# Patient Record
Sex: FEMALE | Race: BLACK OR AFRICAN AMERICAN | URBAN - METROPOLITAN AREA
[De-identification: names, ages, dates, MRNs, and addresses within clinical notes are randomized per-mention and may not be internally consistent; named-entity substitution may affect disease eponyms.]

---

## 2021-08-28 ENCOUNTER — TELEPHONE ENCOUNTER (OUTPATIENT)
Dept: URBAN - METROPOLITAN AREA CLINIC 13 | Facility: CLINIC | Age: 66
End: 2021-08-28

## 2021-08-29 ENCOUNTER — TELEPHONE ENCOUNTER (OUTPATIENT)
Dept: URBAN - METROPOLITAN AREA CLINIC 13 | Facility: CLINIC | Age: 66
End: 2021-08-29

## 2024-07-30 ENCOUNTER — DASHBOARD ENCOUNTERS (OUTPATIENT)
Age: 69
End: 2024-07-30

## 2024-07-31 ENCOUNTER — OFFICE VISIT (OUTPATIENT)
Dept: URBAN - METROPOLITAN AREA CLINIC 48 | Facility: CLINIC | Age: 69
End: 2024-07-31
Payer: MEDICARE

## 2024-07-31 DIAGNOSIS — Z12.11 COLON CANCER SCREENING: ICD-10-CM

## 2024-07-31 DIAGNOSIS — K59.09 CHRONIC CONSTIPATION: ICD-10-CM

## 2024-07-31 DIAGNOSIS — D50.9 IRON DEFICIENCY ANEMIA, UNSPECIFIED IRON DEFICIENCY ANEMIA TYPE: ICD-10-CM

## 2024-07-31 DIAGNOSIS — Z86.19 HISTORY OF HELICOBACTER PYLORI INFECTION: ICD-10-CM

## 2024-07-31 PROBLEM — 87522002: Status: ACTIVE | Noted: 2024-07-31

## 2024-07-31 PROCEDURE — 99204 OFFICE O/P NEW MOD 45 MIN: CPT | Performed by: PHYSICIAN ASSISTANT

## 2024-07-31 RX ORDER — FERROUS SULFATE TAB EC 325 MG (65 MG FE EQUIVALENT) 325 (65 FE) MG
1 TABLET TABLET DELAYED RESPONSE ORAL ONCE A DAY
Refills: 0 | Status: ACTIVE | COMMUNITY

## 2024-07-31 RX ORDER — PANTOPRAZOLE SODIUM 40 MG/1
1 TABLET TABLET, DELAYED RELEASE ORAL ONCE A DAY
Qty: 90 TABLET | Refills: 3 | Status: ACTIVE | COMMUNITY
Start: 2024-07-31

## 2024-07-31 RX ORDER — ROSUVASTATIN CALCIUM 10 MG/1
1 TABLET TABLET ORAL ONCE A DAY
Refills: 0 | Status: ACTIVE | COMMUNITY

## 2024-07-31 RX ORDER — AMLODIPINE BESYLATE 5 MG/1
1 TABLET TABLET ORAL ONCE A DAY
Refills: 1 | Status: ACTIVE | COMMUNITY

## 2024-07-31 RX ORDER — TRAMADOL HYDROCHLORIDE 50 MG/1
1 TABLET AS NEEDED TABLET ORAL ONCE A DAY
Refills: 0 | Status: ON HOLD | COMMUNITY

## 2024-07-31 RX ORDER — LISINOPRIL AND HYDROCHLOROTHIAZIDE 12.5; 2 MG/1; MG/1
1 TABLET TABLET ORAL ONCE A DAY
Refills: 1 | Status: ACTIVE | COMMUNITY

## 2024-07-31 NOTE — HPI-TODAY'S VISIT:
70 y/o female presents for colon cancer screening. She has h/o HTN, HLD, GERD, Vit D deficiency, SUSANA, CKD. She was seen in the ED 5/2024 for RUQ pain and underwent cholecystectomy by Dr. Bledsoe. She has been seeing pulmonary for evaluation of bilateral lung nodules and had recent bronchoscopy with Bx showing no malignancy or granulomas. She has chronic anemia with iron deficiency, on oral iron therapy (for the last couple of months), dating back to 2022. Repeat labs 7/30/24 showed Hgb of 10.2 and iron studies were in normal range.  She admits to chronic constipation X years which has improved since cholecystectomy. She is now having a normal bowel movement usually every other day, and takes Miralax PRN with relief. She denies melena or hematochezia, but stools appear dark since on oral iron. She denies abdominal pain, indigestion, reflux. She states her last colonoscopy was 10 or so years ago and she also has remote h/o H. pylori. She states her siblings and mother have h/o colon polyps.

## 2024-09-30 ENCOUNTER — OFFICE VISIT (OUTPATIENT)
Dept: URBAN - METROPOLITAN AREA SURGERY CENTER 28 | Facility: SURGERY CENTER | Age: 69
End: 2024-09-30

## 2024-11-26 ENCOUNTER — WEB ENCOUNTER (OUTPATIENT)
Dept: URBAN - METROPOLITAN AREA CLINIC 46 | Facility: CLINIC | Age: 69
End: 2024-11-26

## 2024-12-04 ENCOUNTER — WEB ENCOUNTER (OUTPATIENT)
Dept: URBAN - METROPOLITAN AREA CLINIC 46 | Facility: CLINIC | Age: 69
End: 2024-12-04

## 2025-01-14 ENCOUNTER — OFFICE VISIT (OUTPATIENT)
Dept: URBAN - METROPOLITAN AREA CLINIC 48 | Facility: CLINIC | Age: 70
End: 2025-01-14
Payer: MEDICARE

## 2025-01-14 VITALS
HEIGHT: 64 IN | WEIGHT: 132.6 LBS | BODY MASS INDEX: 22.64 KG/M2 | TEMPERATURE: 97.5 F | SYSTOLIC BLOOD PRESSURE: 110 MMHG | DIASTOLIC BLOOD PRESSURE: 62 MMHG | OXYGEN SATURATION: 99 % | HEART RATE: 77 BPM

## 2025-01-14 DIAGNOSIS — D50.9 IRON DEFICIENCY ANEMIA, UNSPECIFIED IRON DEFICIENCY ANEMIA TYPE: ICD-10-CM

## 2025-01-14 DIAGNOSIS — K59.09 CHRONIC CONSTIPATION: ICD-10-CM

## 2025-01-14 DIAGNOSIS — L50.9 HIVES OF UNKNOWN ORIGIN: ICD-10-CM

## 2025-01-14 PROCEDURE — 99213 OFFICE O/P EST LOW 20 MIN: CPT | Performed by: PHYSICIAN ASSISTANT

## 2025-01-14 RX ORDER — ROSUVASTATIN CALCIUM 10 MG/1
1 TABLET TABLET ORAL ONCE A DAY
Refills: 0 | Status: ACTIVE | COMMUNITY

## 2025-01-14 RX ORDER — PANTOPRAZOLE SODIUM 40 MG/1
1 TABLET TABLET, DELAYED RELEASE ORAL ONCE A DAY
Qty: 90 TABLET | Refills: 3 | Status: ON HOLD | COMMUNITY
Start: 2024-07-31

## 2025-01-14 RX ORDER — LACTOBACILLUS RHAMNOSUS GG 10B CELL
AS DIRECTED CAPSULE ORAL
Status: ACTIVE | COMMUNITY

## 2025-01-14 RX ORDER — LISINOPRIL AND HYDROCHLOROTHIAZIDE 12.5; 2 MG/1; MG/1
1 TABLET TABLET ORAL ONCE A DAY
Refills: 1 | Status: ACTIVE | COMMUNITY

## 2025-01-14 RX ORDER — AMLODIPINE BESYLATE 10 MG/1
1 TABLET TABLET ORAL ONCE A DAY
Refills: 1 | Status: ACTIVE | COMMUNITY

## 2025-01-14 RX ORDER — FERROUS SULFATE TAB EC 325 MG (65 MG FE EQUIVALENT) 325 (65 FE) MG
1 TABLET TABLET DELAYED RESPONSE ORAL ONCE A DAY
Refills: 0 | Status: ACTIVE | COMMUNITY

## 2025-01-14 RX ORDER — DICYCLOMINE HYDROCHLORIDE 20 MG/1
1 TABLET TABLET ORAL THREE TIMES A DAY
Qty: 90 | Refills: 3 | Status: ON HOLD | COMMUNITY
Start: 2024-12-02 | End: 2025-04-01

## 2025-01-14 NOTE — HPI-TODAY'S VISIT:
70 y/o female presents for follow up. She was seen in July 2024 for CRCS, chronic constipation, and chronic anemia. She has h/o HTN, HLD, GERD, Vit D deficiency, SUSANA, CKD. She was seen in the ED 5/2024 for RUQ pain and underwent cholecystectomy by Dr. Bledsoe. She has been seeing pulmonary for evaluation of bilateral lung nodules and had recent bronchoscopy with Bx showing no malignancy or granulomas. She has chronic anemia with iron deficiency, on oral iron therapy (for the last couple of months), dating back to 2022. Repeat labs 7/30/24 showed Hgb of 10.2 and iron studies were in normal range. Labs 10/30/24 show stable Hgb of 9.7, iron panel within normal ranges, Cr 1.41 and GFR 40.   She admits to chronic constipation X years which has improved since cholecystectomy. She denies melena or hematochezia, but stools appear dark since on oral iron. She denies abdominal pain, indigestion, reflux. EGD/Colon done 9/2024 showed small hiatla hernia, H. pylori negative gastritis, 2 small hyperplastic polyps removed, non bleeding internal hemorrhoids, pan diverticulosis. She states her siblings and mother have h/o colon polyps.  Since her colonoscopy, she reports continued constipation with a movement every 2-4 days. However, she now states with every bowel movement, she gets tingling/prickling of her skin diffusely, which she also feels in her lips and tongue, and then when she has a bowel movement, she breaks out in hives and has diffuse itching. She has tried taking Benadryl. She took some probiotics which initially seemed to give improvement, but symptoms have returned. Last episode was Sunday. Symptoms are worse the more constipated she feels. She has been taking some Miralax, but not routinely.

## 2025-02-12 ENCOUNTER — OFFICE VISIT (OUTPATIENT)
Dept: URBAN - METROPOLITAN AREA CLINIC 48 | Facility: CLINIC | Age: 70
End: 2025-02-12

## 2025-02-18 ENCOUNTER — OFFICE VISIT (OUTPATIENT)
Dept: URBAN - METROPOLITAN AREA CLINIC 48 | Facility: CLINIC | Age: 70
End: 2025-02-18
Payer: MEDICARE

## 2025-02-18 VITALS
WEIGHT: 136.8 LBS | SYSTOLIC BLOOD PRESSURE: 135 MMHG | OXYGEN SATURATION: 99 % | TEMPERATURE: 97.9 F | HEIGHT: 64 IN | DIASTOLIC BLOOD PRESSURE: 80 MMHG | BODY MASS INDEX: 23.35 KG/M2 | HEART RATE: 70 BPM

## 2025-02-18 DIAGNOSIS — D50.9 IRON DEFICIENCY ANEMIA, UNSPECIFIED IRON DEFICIENCY ANEMIA TYPE: ICD-10-CM

## 2025-02-18 DIAGNOSIS — K59.09 CHRONIC CONSTIPATION: ICD-10-CM

## 2025-02-18 PROCEDURE — 99213 OFFICE O/P EST LOW 20 MIN: CPT | Performed by: PHYSICIAN ASSISTANT

## 2025-02-18 RX ORDER — LISINOPRIL AND HYDROCHLOROTHIAZIDE 12.5; 2 MG/1; MG/1
1 TABLET TABLET ORAL ONCE A DAY
Refills: 1 | Status: ACTIVE | COMMUNITY

## 2025-02-18 RX ORDER — AMLODIPINE BESYLATE 10 MG/1
1 TABLET TABLET ORAL ONCE A DAY
Refills: 1 | Status: ACTIVE | COMMUNITY

## 2025-02-18 RX ORDER — DICYCLOMINE HYDROCHLORIDE 20 MG/1
1 TABLET TABLET ORAL THREE TIMES A DAY
Qty: 90 | Refills: 3 | Status: ON HOLD | COMMUNITY
Start: 2024-12-02 | End: 2025-04-01

## 2025-02-18 RX ORDER — FERROUS SULFATE TAB EC 325 MG (65 MG FE EQUIVALENT) 325 (65 FE) MG
1 TABLET TABLET DELAYED RESPONSE ORAL ONCE A DAY
Refills: 0 | Status: ACTIVE | COMMUNITY

## 2025-02-18 RX ORDER — LACTOBACILLUS RHAMNOSUS GG 10B CELL
AS DIRECTED CAPSULE ORAL
Status: ACTIVE | COMMUNITY

## 2025-02-18 RX ORDER — ROSUVASTATIN CALCIUM 10 MG/1
1 TABLET TABLET ORAL ONCE A DAY
Refills: 0 | Status: ACTIVE | COMMUNITY

## 2025-02-18 RX ORDER — PANTOPRAZOLE SODIUM 40 MG/1
1 TABLET TABLET, DELAYED RELEASE ORAL ONCE A DAY
Qty: 90 TABLET | Refills: 3 | Status: ON HOLD | COMMUNITY
Start: 2024-07-31

## 2025-02-18 NOTE — HPI-TODAY'S VISIT:
70 y/o female presents for follow up. She was seen in July 2024 for CRCS, chronic constipation, and chronic anemia. She has h/o HTN, HLD, GERD, Vit D deficiency, SUSANA, CKD. She was seen in the ED 5/2024 for RUQ pain and underwent cholecystectomy by Dr. Bledsoe. She has been seeing pulmonary for evaluation of bilateral lung nodules and had recent bronchoscopy with Bx showing no malignancy or granulomas. She has chronic anemia with iron deficiency, on oral iron therapy (for the last couple of months), dating back to 2022. Repeat labs 7/30/24 showed Hgb of 10.2 and iron studies were in normal range. Labs 10/30/24 show stable Hgb of 9.7, iron panel within normal ranges, Cr 1.41 and GFR 40.   She admits to chronic constipation X years which has improved since cholecystectomy. She denies melena or hematochezia, but stools appear dark since on oral iron. She denies abdominal pain, indigestion, reflux. EGD/Colon done 9/2024 showed small hiatla hernia, H. pylori negative gastritis, 2 small hyperplastic polyps removed, non bleeding internal hemorrhoids, pan diverticulosis. She states her siblings and mother have h/o colon polyps.  Since her colonoscopy, she reports continued constipation with a movement every 2-4 days. However, she now states with every bowel movement, she gets tingling/prickling of her skin diffusely, which she also feels in her lips and tongue, and then when she has a bowel movement, she breaks out in hives and has diffuse itching. She has tried taking Benadryl. She took some probiotics which initially seemed to give improvement, but symptoms have returned. Last episode was Sunday. Symptoms are worse the more constipated she feels. She has been taking some Miralax, but not routinely. She was advised to take Miralax daily, and Linzess 72 mcg samples given to try if needed. She was also referred to asthma and allergy for evaluation of hives.   2/18/25 for follow up: Labs 1/17/25 with impaired renal function (Cr 1.6 and GFR 34); no CBC done at that time. She states since regular use of Miralax and stool softener, she is having regular, formed bowel movements, and hives/allergic-type reactions have subsided. She never heard from Dr. Choi's office. She has no new concerns. She states she has been anemic all of her life, and is taking oral iron regularly.